# Patient Record
Sex: MALE | Race: OTHER | NOT HISPANIC OR LATINO | URBAN - METROPOLITAN AREA
[De-identification: names, ages, dates, MRNs, and addresses within clinical notes are randomized per-mention and may not be internally consistent; named-entity substitution may affect disease eponyms.]

---

## 2022-07-20 ENCOUNTER — EMERGENCY (EMERGENCY)
Facility: HOSPITAL | Age: 42
LOS: 1 days | Discharge: HOME | End: 2022-07-20
Attending: EMERGENCY MEDICINE
Payer: MEDICAID

## 2022-07-20 VITALS
RESPIRATION RATE: 15 BRPM | SYSTOLIC BLOOD PRESSURE: 131 MMHG | TEMPERATURE: 98 F | WEIGHT: 199.96 LBS | OXYGEN SATURATION: 98 % | HEART RATE: 72 BPM | DIASTOLIC BLOOD PRESSURE: 78 MMHG | HEIGHT: 74 IN

## 2022-07-20 VITALS
HEART RATE: 76 BPM | OXYGEN SATURATION: 98 % | SYSTOLIC BLOOD PRESSURE: 126 MMHG | RESPIRATION RATE: 16 BRPM | DIASTOLIC BLOOD PRESSURE: 76 MMHG | TEMPERATURE: 98 F

## 2022-07-20 PROCEDURE — 99283 EMERGENCY DEPT VISIT LOW MDM: CPT

## 2022-07-20 PROCEDURE — 70450 CT HEAD/BRAIN W/O DYE: CPT | Mod: 26,MA

## 2022-07-20 PROCEDURE — 70450 CT HEAD/BRAIN W/O DYE: CPT | Mod: MA

## 2022-07-20 PROCEDURE — 99284 EMERGENCY DEPT VISIT MOD MDM: CPT | Mod: 25

## 2022-07-20 NOTE — ED PROVIDER NOTE - PATIENT PORTAL LINK FT
You can access the FollowMyHealth Patient Portal offered by [facility] by registering at the following website: [email or web address]. By joining [facility]’s FollowMyHealth portal, you will also be able to view your health information using other applications (apps) compatible with our system.

## 2022-07-20 NOTE — ED PROVIDER NOTE - CLINICAL SUMMARY MEDICAL DECISION MAKING FREE TEXT BOX
[id number]. Pt is clinically sober, A&O x 3. Denies suicidal ideation, refused detox, denies being un-domiciled, refused  consult.   CT reported : Small left frontal lateral scalp hematoma. No acute intracranial pathology.    Pt is well appearing, has no new complaints and able to walk with normal gait. Pt is stable for discharge and follow up with medical doctor. Pt educated on care and need for follow up. Discussed anticipatory guidance and return precautions. Questions answered. I had a detailed discussion with the patient and/or guardian regarding the historical points, exam findings, and any diagnostic results supporting the discharge diagnosis.

## 2022-07-20 NOTE — ED ADULT NURSE NOTE - NURSING NEURO ORIENTATION
oriented to person, place and time/disoriented to person/disoriented to place/disoriented to time/situation

## 2022-07-20 NOTE — ED PROVIDER NOTE - OBJECTIVE STATEMENT
Endorsed by triage pt was intoxicated and BIBEMS for laying on side walk.  Pt slept in ED but responsive to verbal and tactile stimuli.

## 2022-07-20 NOTE — ED ADULT NURSE NOTE - NSIMPLEMENTINTERV_GEN_ALL_ED
Implemented All Fall Risk Interventions:  [place] to call system. Call bell, personal items and telephone within reach. Instruct patient to call for assistance. Room bathroom lighting operational. Non-slip footwear when patient is off stretcher. Physically safe environment: no spills, clutter or unnecessary equipment. Stretcher in lowest position, wheels locked, appropriate side rails in place. Provide visual cue, wrist band, yellow gown, etc. Monitor gait and stability. Monitor for mental status changes and reorient to person, place, and time. Review medications for side effects contributing to fall risk. Reinforce activity limits and safety measures with patient and family.

## 2022-07-20 NOTE — ED PROVIDER NOTE - SKIN, MLM
Call to patient. Patient denies any complaints related to anticoagulation therapy at this time. Patient reports no change in medication, diet or health. Reinforced with patient to call clinic with any medication changes as this can impact INR. Reinforced signs and symptoms bleeding/clotting with patient.  Patient aware to seek medical care if signs and symptoms develop. Advised that if patient falls and/or hits their head, they should seek medical attention. Verbalizes understanding.     Dosing instructions given to patient verbally over the phone. Advised to call the clinic with any questions or concerns. Patient verbalizes understanding. Has clinic number.    Anticoagulation Summary  As of 5/30/2018    INR goal:   2.5-3.5   TTR:   52.6 % (2.6 y)   Today's INR:   4.0!   Warfarin maintenance plan:   5 mg (2.5 mg x 2) on M, F; 2.5 mg (2.5 mg x 1) all other days   Weekly warfarin total:   22.5 mg   Plan last modified:   Courtney Frias RN (5/30/2018)   Next INR check:   6/6/2018   Priority:   Follow-Up - 2 Weeks   Target end date:   Indefinite    Indications    Long term (current) use of anticoagulants [Z79.01]  S/P AVR (aortic valve replacement) [Z95.2]             Anticoagulation Episode Summary     INR check location:   [facility]    Preferred lab:       Send INR reminders to:   [facility] (OPEN ENROLLMENT) ACS CARD/EP    Comments:   Next STAC due 10/11/18 ;Self Monitor; 2.5 mg tablets; \"Pat\"; call work phone (Option 4) 5/2/18 [facility]      Anticoagulation Care Providers     Provider Role Specialty Phone number    Trevor Birmingham MD Referring Cardiovascular Disease [phone]          Supervising provider:    [place]  
Skin normal color for race, warm, dry and intact. No evidence of rash.

## 2022-07-20 NOTE — ED PROVIDER NOTE - NSFOLLOWUPCLINICS_GEN_ALL_ED_FT
[facility]  [address] [place] [address]  Phone: [phone]  Fax: [phone]

## 2022-07-20 NOTE — ED ADULT NURSE NOTE - OBJECTIVE STATEMENT
the patient is a 41 y male  complaining of altered  mental status . pt was found intoxicated in the street

## 2023-06-16 NOTE — ED ADULT TRIAGE NOTE - HEIGHT IN CM
RN spoke to ROSLYN Lewis with the [facility]. She states she spoke with IVETTE Linares for patient and patient is going to resume all care with physician through the [facility]. Nothing further needed from Dr. Rey's office. All current orders removed.    187.96

## 2024-06-01 NOTE — ED PROVIDER NOTE - NSFOLLOWUPINSTRUCTIONS_ED_ALL_ED_FT
Do not drink alcohol in excess.  Return to ER if you have pain, fever, vomiting, feel depressed, anxious or unsafe. See contact information for detox facility. If you need any assistance for appointments please contact our Care Coordinator at [phone].
Unknown